# Patient Record
(demographics unavailable — no encounter records)

---

## 2025-05-20 NOTE — ASSESSMENT
[FreeTextEntry1] : 78yoF with mild Left hip OA seen on xrays with cc of radiating LLE pain from hip to foot.  Patient has not been evaluated by spine specialist.  Explained to patient that her primary issue is unlikely due to the mild arthritis in the hip.  Patient offers history of lumbar disc herniations.  Patient has an appt to see Dr. Myers in the upcoming weeks.  Will defer to spine at this time.  -Activities as tolerated -Rest, ice, compression, elevation, NSAIDs PRN for pain. -All questions answered -F/u prn   The diagnosis was explained in detail. The potential non-surgical and surgical treatments were reviewed. The relative risks and benefits of each option were considered relative to the patients age, activity level, medical history, symptom severity and previously attempted treatments.   The patient was advised to consult with their primary medical provider prior to initiation of any new medications to reduce the risk of adverse effects specific to their long-term home medications and medical history.    The patient's current medications management of their orthopedic diagnosis was reviewed today:   1) We discussed a comprehensive treatment plan that included possible pharmaceutical management involving the use of prescription strength medications including but not limited to options as oral Naprosyn 500mg BID, once daily Meloxicam 15 mg, or 500-650 mg Tylenol versus over the counter oral medications and topical prescriptions NSAID Pennsaid vs over the counter Voltaren gel.  2) There is a moderate risk of morbidity with further treatment, especially from use of prescription strength medications and possible side effects of these medications which include upset stomach with oral medications, skin reactions to topical medications and cardiac/renal issues with long term use.  3) I recommended that hte patient follow-up with their medical physician to discuss any significant specific potential issues with long term medication use such as interactions with current medications or with exacerbation of underlying medical comorbidities.  4) The benefits and risks associated with use of injectable, oral or topical, prescription and over the counter anti-inflammatory medications were discussed with the patient. The patient voiced understanding of the risks including but not limited to bleeding, stroke, kidney dysfunction, heart disease, and were referred to the black box warning label for further information

## 2025-05-20 NOTE — HISTORY OF PRESENT ILLNESS
[Dull/Aching] : dull/aching [Throbbing] : throbbing [Intermittent] : intermittent [Household chores] : household chores [Leisure] : leisure [Social interactions] : social interactions [Rest] : rest [Retired] : Work status: retired [Gradual] : gradual [8] : 8 [0] : 0 [Sharp] : sharp [de-identified] : Patient here for left hip. Patient states NKI. Patient came with x-ray from a pain management doctor. Patient states she has been in pain for approx 1 year. Patient states she has sharp pain in her groin when going from sitting to standing and occasionally with weight bearing. Patient has had 2 injections in her groin with the last injection approx 2-3 months ago with no relief. Patient reports of mild left groin pain and significant radiating pain from lateral hip down to dorsum of foot.  Patient has not been evaluated by spine doctor.   Patient has not done any PT. Patient came into office ambulating on her own.  [] : Post Surgical Visit: no [FreeTextEntry1] : Left hip  [FreeTextEntry5] : NKI  [de-identified] : Movement

## 2025-06-06 NOTE — DISCUSSION/SUMMARY
[de-identified] : I discussed non operative and operative treatment options in great detail with the patient. I discussed treatment options, including but not limited to, 1. Non operative treatment - rest, NSAID, physical therapy etc. 2. Interventional treatment - injections etc. 3. Surgical treatment - Transpedicular decompression, discectomy L1-L2   - Patient with persistent symptoms refractory to non-operative care. Patient is seeking surgical options. Patient is a candidate for surgery after failing extensive non operative care.  Patient wants to proceed with surgical intervention after failing nonoperative care. I had a lengthy discussion with the patient about the rational and goal of the surgery as well as expected outcome. I encouraged patient to seek a second opinion regarding surgery. I explained postoperative rehabilitation and recovery process to the patient. I discussed risks, benefits and alternatives of the procedure in detail with the patient. I counseled patient about risks and possible complications, including but not limited to, infection, bleeding, nerve injury, arterial and venous injury, single or multiple muscle group weakness, dural tear, CSF leak, pseudomeningocele, arachnoiditis, CSF fistula, meningitis, discitis, osteomyelitis, epidural hematoma, DVT, PE, CRPS, MI, paralysis, recurrent disc herniation, post laminectomy instability, need for fusion, persistent symptoms, and risks of anesthesia. I explained to the patient that the surgical outcome is unpredictable and there is no guarantee that the symptoms will resolve after the surgery. The patient understands and wishes to proceed. All questions were answered and patient was given information to review.

## 2025-06-06 NOTE — DISCUSSION/SUMMARY
[de-identified] : I discussed non operative and operative treatment options in great detail with the patient. I discussed treatment options, including but not limited to, 1. Non operative treatment - rest, NSAID, physical therapy etc. 2. Interventional treatment - injections etc. 3. Surgical treatment - Transpedicular decompression, discectomy L1-L2   - Patient with persistent symptoms refractory to non-operative care. Patient is seeking surgical options. Patient is a candidate for surgery after failing extensive non operative care.  Patient wants to proceed with surgical intervention after failing nonoperative care. I had a lengthy discussion with the patient about the rational and goal of the surgery as well as expected outcome. I encouraged patient to seek a second opinion regarding surgery. I explained postoperative rehabilitation and recovery process to the patient. I discussed risks, benefits and alternatives of the procedure in detail with the patient. I counseled patient about risks and possible complications, including but not limited to, infection, bleeding, nerve injury, arterial and venous injury, single or multiple muscle group weakness, dural tear, CSF leak, pseudomeningocele, arachnoiditis, CSF fistula, meningitis, discitis, osteomyelitis, epidural hematoma, DVT, PE, CRPS, MI, paralysis, recurrent disc herniation, post laminectomy instability, need for fusion, persistent symptoms, and risks of anesthesia. I explained to the patient that the surgical outcome is unpredictable and there is no guarantee that the symptoms will resolve after the surgery. The patient understands and wishes to proceed. All questions were answered and patient was given information to review.

## 2025-06-06 NOTE — HISTORY OF PRESENT ILLNESS
[de-identified] : Body Part: lower back  Length of symptoms/ DOI: 1 year  Cause of accident/ injury: NKI Radicular symptoms: pain going down the LT leg Quality of pain: sharp,  Pain at Rest:  8/10 Pain with activity/ walkin/10 Pain worsens with: sitting, sitting to standing  Pain improves with: laying in certain ways  Previous treatments: Dr.Frankenberger - Pain management  Recent Imagin2024 Current treatments: 2025 - LAKSHMI with pain management - with little to no relief  Current medications for pain: gabapentin - helps sometimes  Work status/ occupation: not working  Assisted walking device: none

## 2025-06-06 NOTE — PHYSICAL EXAM
[de-identified] : Constitutional: Well groomed and developed. Respiratory: Normal, unlabored breathing. No use of accessory muscles. Skin: No rashes or ulcers. Skin warm and dry. Psychiatric: Oriented to time, place, person and event. No acute distress. Gait: Heel to toe Patient able to walk on toes and heels.   Lumbar spine: Posture: Normal on coronal and sagittal alignment AROM: Flexion 70 Extension 10 Moderate pain with simulated truncal motion   Tenderness: Thoracic: No tenderness on palpation Lumbar: Moderate tenderness on palpation     TTP LEFT L4-5 Sacrum/coccyx: no tenderness on palpation Greater trochanteric bursa:  No tenderness PSIS: None     Motor:                          R             L LE:                               IS                    5             4 Quad              5             5 TA                  5             5 EHL                5             5 Gastroc         5             5                  R  L DTR: Patella  2+  2+ Achilles  2+  2+   Sensory: Light touch sensation intact T12-S1 Babinski's Sign: Negative bilaterally Straight leg raise test: Negative bilaterally EFFIE test: Negative bilaterally Facet loading: Negative bilaterally

## 2025-06-06 NOTE — DATA REVIEWED
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

## 2025-06-06 NOTE — HISTORY OF PRESENT ILLNESS
[de-identified] : Body Part: lower back  Length of symptoms/ DOI: 1 year  Cause of accident/ injury: NKI Radicular symptoms: pain going down the LT leg Quality of pain: sharp,  Pain at Rest:  8/10 Pain with activity/ walkin/10 Pain worsens with: sitting, sitting to standing  Pain improves with: laying in certain ways  Previous treatments: Dr.Frankenberger - Pain management  Recent Imagin2024 Current treatments: 2025 - LAKSHMI with pain management - with little to no relief  Current medications for pain: gabapentin - helps sometimes  Work status/ occupation: not working  Assisted walking device: none

## 2025-06-06 NOTE — ASSESSMENT
[FreeTextEntry1] : 6/2024 StandUp MRI of L-Spine was reviewed today and is as follows:  L1-2 central HNP with left paracentral stenosis L4-5 left paracentral HNP with Grade 1 spondylolisthesis  77 yo female with chronic lower back pain that extends into the left lower extremity. Patient has completed MRI in past, reviewed today with patient, demonstrating L1-2 HNP with stenosis. Patient also demonstrates severe radicular pain in the left groin and lower extremity. At this time, due t patients chronic worsening pain, patient is indicated for Endoscopic Lumbar Discectomy L1-2.    - Recommend NSAIDs PRN - Recommend heating pad use to decrease muscle spasm - Discussed the importance of home exercises, including but not limited to hamstring stretching and core strengthening   Patient was educated on their diagnosis today. All questions answered and patient expressed understanding.

## 2025-06-06 NOTE — PHYSICAL EXAM
[de-identified] : Constitutional: Well groomed and developed. Respiratory: Normal, unlabored breathing. No use of accessory muscles. Skin: No rashes or ulcers. Skin warm and dry. Psychiatric: Oriented to time, place, person and event. No acute distress. Gait: Heel to toe Patient able to walk on toes and heels.   Lumbar spine: Posture: Normal on coronal and sagittal alignment AROM: Flexion 70 Extension 10 Moderate pain with simulated truncal motion   Tenderness: Thoracic: No tenderness on palpation Lumbar: Moderate tenderness on palpation     TTP LEFT L4-5 Sacrum/coccyx: no tenderness on palpation Greater trochanteric bursa:  No tenderness PSIS: None     Motor:                          R             L LE:                               IS                    5             4 Quad              5             5 TA                  5             5 EHL                5             5 Gastroc         5             5                  R  L DTR: Patella  2+  2+ Achilles  2+  2+   Sensory: Light touch sensation intact T12-S1 Babinski's Sign: Negative bilaterally Straight leg raise test: Negative bilaterally EFFIE test: Negative bilaterally Facet loading: Negative bilaterally